# Patient Record
Sex: FEMALE | Race: WHITE | NOT HISPANIC OR LATINO | Employment: OTHER | URBAN - METROPOLITAN AREA
[De-identification: names, ages, dates, MRNs, and addresses within clinical notes are randomized per-mention and may not be internally consistent; named-entity substitution may affect disease eponyms.]

---

## 2021-12-30 PROBLEM — S32.9XXA: Status: ACTIVE | Noted: 2021-08-15

## 2022-01-05 ENCOUNTER — OFFICE VISIT (OUTPATIENT)
Dept: CARDIOLOGY | Facility: CLINIC | Age: 67
End: 2022-01-05

## 2022-01-05 VITALS
RESPIRATION RATE: 18 BRPM | WEIGHT: 126.4 LBS | SYSTOLIC BLOOD PRESSURE: 142 MMHG | DIASTOLIC BLOOD PRESSURE: 80 MMHG | HEART RATE: 88 BPM | HEIGHT: 66 IN | BODY MASS INDEX: 20.31 KG/M2

## 2022-01-05 DIAGNOSIS — R07.89 CHEST PAIN, ATYPICAL: Primary | ICD-10-CM

## 2022-01-05 PROCEDURE — 99204 OFFICE O/P NEW MOD 45 MIN: CPT | Performed by: INTERNAL MEDICINE

## 2022-01-05 RX ORDER — ATENOLOL 50 MG/1
50 TABLET ORAL DAILY
Qty: 30 TABLET | Refills: 5 | Status: SHIPPED | OUTPATIENT
Start: 2022-01-05 | End: 2022-04-27 | Stop reason: SDUPTHER

## 2022-01-05 RX ORDER — ATENOLOL 25 MG/1
TABLET ORAL DAILY
COMMUNITY
Start: 2021-12-13 | End: 2022-01-05 | Stop reason: DRUGHIGH

## 2022-01-05 RX ORDER — NITROGLYCERIN 0.4 MG/1
TABLET SUBLINGUAL AS NEEDED
COMMUNITY
Start: 2021-12-13

## 2022-01-21 NOTE — PROGRESS NOTES
"Cardiology Clinic Note  Redd Bashir MD, PhD    Subjective:     Encounter Date:01/05/2022      Patient ID: Juliann Turner is a 67 y.o. female.    Chief Complaint:  Chief Complaint   Patient presents with   • Chest Pain       HPI:    At the pleasure of seeing this very pleasant 66-year-old female is a new patient for chest pain.  Blood pressure 142/80 heart rate in the eighties.  She has had hypertension, she complains of substernal chest pain nonradiating sporadic, sometimes worse with exertion but also comes on at rest, she describes a 6-7 out of 10, aching moderate nonradiating, no palpitations or unexplained syncope, these things last seconds to minutes and then resolve spontaneously usually stress-induced.  No diaphoresis, no heart failure signs or symptoms.  She has not had 2D echo or ischemic evaluation in the past.  No family history of early CAD, mother with pacemaker, she is a smoker for many years at a pack per day, she is not diabetic    Historical data copied forward from previous encounters in EMR including the history, exam, and assessment/plan has been reviewed and is unchanged unless noted otherwise.    Cardiac medicines reviewed with risk, benefits, and necessity of each discussed.    Risk and benefit of cardiac testing reviewed including death heart attack stroke pain bleeding infection need for vascular /cardiovascular surgery were discussed and the patient     Objective:         /80 (BP Location: Left arm, Patient Position: Sitting)   Pulse 88   Resp 18   Ht 167.6 cm (66\")   Wt 57.3 kg (126 lb 6.4 oz)   BMI 20.40 kg/m²     Physical Exam  Regular rate and rhythm eighties no rubs gallops heaves lymph  No rubs gallops heaves left  No clubbing cyanosis or edema  Radial pulses 2+ bilaterally  Normal cap refill  Clear to auscultation    Assessment:         Diagnoses and all orders for this visit:    1. Chest pain, atypical (Primary)  -     Adult Transthoracic Echo Complete W/ Cont if " Necessary Per Protocol; Future  -     Stress Test With Myocardial Perfusion One Day; Future    Other orders  -     atenolol (TENORMIN) 50 MG tablet; Take 1 tablet by mouth Daily.  Dispense: 30 tablet; Refill: 5           Plan:          Chest pain concerning for angina, stress exacerbates, smoker is a risk factor along with hypertension and age  Stress test with nuclear imaging for restratification  Abnormal EKG, dyspnea exertion and chest pain, 2D echo for structural and functional evaluation along with shortness of breath, evaluate diastolic dysfunction valvular heart function    EKG is reviewed and interpreted by me recently obtained from outside clinic demonstrates sinus rhythm with normal ST-T wave findings, possibly incomplete right bundle branch block  And fasting lipid studies ultimately as outpatient    Uncontrolled blood pressure  Increase atenolol to 50 daily      Return clinic 30 days for follow-up and further recommendations    The pleasure to be involved in this patient's cardiovascular care.  Please call with any questions or concerns  Redd Bashir MD, PhD    Most recent EKG as reviewed and interpreted by me:  Procedures     Most recent echo as reviewed and interpreted by me:      Most recent stress test as reviewed and interpreted by me:      Most recent cardiac catheterization as reviewed interpreted by me:  No results found for this or any previous visit.    The following portions of the patient's history were reviewed and updated as appropriate: allergies, current medications, past family history, past medical history, past social history, past surgical history and problem list.      ROS:  14 point review of systems negative except as mentioned above    Current Outpatient Medications:   •  nitroglycerin (NITROSTAT) 0.4 MG SL tablet, As Needed., Disp: , Rfl:   •  atenolol (TENORMIN) 50 MG tablet, Take 1 tablet by mouth Daily., Disp: 30 tablet, Rfl: 5    Problem List:  Patient Active Problem List    Diagnosis   • Pelvis fracture, right, closed, initial encounter ([facility])   • Hypertension     Past Medical History:  Past Medical History:   Diagnosis Date   • Hypertension      Past Surgical History:  Past Surgical History:   Procedure Laterality Date   • APPENDECTOMY     • TONSILLECTOMY       Social History:  Social History     Socioeconomic History   • Marital status: Unknown   Tobacco Use   • Smoking status: Current Every Day Smoker     Packs/day: 1.00     Types: Cigarettes   Substance and Sexual Activity   • Alcohol use: Yes     Alcohol/week: 7.0 standard drinks     Types: 7 Glasses of wine per week     Allergies:  No Known Allergies  Immunizations:    There is no immunization history on file for this patient.         In-Office Procedure(s):  No orders to display        ASCVD RIsk Score::  The ASCVD Risk score (Christel VIDHYA Jr., et al., 2013) failed to calculate for the following reasons:    Cannot find a previous HDL lab    Cannot find a previous total cholesterol lab    Imaging:                 Lab Review:   No visits with results within 6 Month(s) from this visit.   Latest known visit with results is:   No results found for any previous visit.     Recent labs reviewed and interpreted for clinical significance and application            Level of Care:           Redd Bashir MD  01/20/22  .

## 2022-01-25 ENCOUNTER — PATIENT ROUNDING (BHMG ONLY) (OUTPATIENT)
Dept: CARDIOLOGY | Facility: CLINIC | Age: 67
End: 2022-01-25

## 2022-01-25 NOTE — PROGRESS NOTES
January 25, 2022    Hello, may I speak with Juliann Turner?    My name is Honey Turcios    I am  with [facility]  [address] [place] [address].    Before we get started may I verify your date of birth? 1955    I am calling to officially welcome you to our practice and ask about your recent visit. Is this a good time to talk? Yes    Tell me about your visit with us. What things went well?  Went real good was very happy with everything       We're always looking for ways to make our patients' experiences even better. Do you have recommendations on ways we may improve?  no    Overall were you satisfied with your first visit to our practice? Yes       I appreciate you taking the time to speak with me today. Is there anything else I can do for you? No      Thank you, and have a great day.

## 2022-02-01 ENCOUNTER — APPOINTMENT (OUTPATIENT)
Dept: NUCLEAR MEDICINE | Facility: HOSPITAL | Age: 67
End: 2022-02-01

## 2022-02-01 ENCOUNTER — APPOINTMENT (OUTPATIENT)
Dept: CARDIOLOGY | Facility: HOSPITAL | Age: 67
End: 2022-02-01

## 2022-02-16 ENCOUNTER — APPOINTMENT (OUTPATIENT)
Dept: NUCLEAR MEDICINE | Facility: HOSPITAL | Age: 67
End: 2022-02-16

## 2022-02-16 ENCOUNTER — APPOINTMENT (OUTPATIENT)
Dept: CARDIOLOGY | Facility: HOSPITAL | Age: 67
End: 2022-02-16

## 2022-03-23 ENCOUNTER — OFFICE VISIT (OUTPATIENT)
Dept: CARDIOLOGY | Facility: CLINIC | Age: 67
End: 2022-03-23

## 2022-03-23 VITALS
HEART RATE: 86 BPM | BODY MASS INDEX: 19.93 KG/M2 | RESPIRATION RATE: 18 BRPM | WEIGHT: 124 LBS | HEIGHT: 66 IN | DIASTOLIC BLOOD PRESSURE: 100 MMHG | SYSTOLIC BLOOD PRESSURE: 170 MMHG

## 2022-03-23 DIAGNOSIS — R07.89 CHEST PAIN, ATYPICAL: Primary | ICD-10-CM

## 2022-03-23 DIAGNOSIS — I10 PRIMARY HYPERTENSION: ICD-10-CM

## 2022-03-23 PROCEDURE — 99214 OFFICE O/P EST MOD 30 MIN: CPT | Performed by: INTERNAL MEDICINE

## 2022-03-23 RX ORDER — AMLODIPINE BESYLATE 5 MG/1
5 TABLET ORAL DAILY
Qty: 90 TABLET | Refills: 1 | Status: SHIPPED | OUTPATIENT
Start: 2022-03-23 | End: 2022-03-24 | Stop reason: SDUPTHER

## 2022-03-24 ENCOUNTER — TELEPHONE (OUTPATIENT)
Dept: CARDIOLOGY | Facility: CLINIC | Age: 67
End: 2022-03-24

## 2022-03-24 RX ORDER — AMLODIPINE BESYLATE 5 MG/1
5 TABLET ORAL DAILY
Qty: 90 TABLET | Refills: 1 | Status: SHIPPED | OUTPATIENT
Start: 2022-03-24 | End: 2022-04-27 | Stop reason: SDUPTHER

## 2022-03-24 NOTE — PROGRESS NOTES
Cardiology Clinic Note  Redd Bashir MD, PhD    Subjective:     Encounter Date:03/23/2022      Patient ID: Juliann Turner is a 67 y.o. female.    Chief Complaint:  Chief Complaint   Patient presents with   • Follow-up       HPI:    Previously I had the pleasure of seeing this very pleasant 66-year-old female is a new patient for chest pain.  Blood pressure 142/80 heart rate in the eighties.  She has had hypertension, she complains of substernal chest pain nonradiating sporadic, sometimes worse with exertion but also comes on at rest, she describes a 6-7 out of 10, aching moderate nonradiating, no palpitations or unexplained syncope, these things last seconds to minutes and then resolve spontaneously usually stress-induced.  No diaphoresis, no heart failure signs or symptoms.  She has not had 2D echo or ischemic evaluation in the past.  No family history of early CAD, mother with pacemaker, she is a smoker for many years at a pack per day, she is not diabetic.  She still has these similar complaints but did not get echo or stress since her prior encounter.  Her blood pressure still uncontrolled today at 170/100 with heart rates in the 80s.  She still a current smoker.  She has diffuse bruising on her extensor surfaces.  She can still complains of episodic chest discomfort described as heaviness with exertion.  Which is concerning for angina.  She is still a smoker and was counseled to quit extensively today       Historical data copied forward from previous encounters in EMR including the history, exam, and assessment/plan has been reviewed and is unchanged unless noted otherwise.    Cardiac medicines reviewed with risk, benefits, and necessity of each discussed.    Risk and benefit of cardiac testing reviewed including death heart attack stroke pain bleeding infection need for vascular /cardiovascular surgery were discussed and the patient     Objective:         /100 (BP Location: Left arm, Patient Position:  "Sitting)   Pulse 86   Resp 18   Ht 167.6 cm (66\")   Wt 56.2 kg (124 lb)   BMI 20.01 kg/m²     Physical Exam  Regular rate and rhythm no rubs or gallops  No heave no lift  No clubbing cyanosis or edema  Extensive bruising on extensor surfaces  Thin skin ultimately  Neurochecks consistent with likely COPD, lungs are clear no expiratory wheezes  Radial pulses 1+ bilaterally  Thin female  Walking with crutch at this time  Intact grossly  Assessment:         Chest pain  Cannot rule out angina  EKG was okay previously  Did not get ordered stress or echo  Needs to repeat these for risk assessment  Hypertension remains uncontrolled start amlodipine 5 daily in addition to her atenolol    See her back in 30 days for test results and discussion of further recommendations    Redd Bashir MD, PhD    Smoking cessation discussed  Primary prevention goals presently  Diet and exercise as allowed by functional status  Fall precautions with debility        The pleasure to be involved in this patient's cardiovascular care.  Please call with any questions or concerns  Redd Bashir MD, PhD    Most recent EKG as reviewed and interpreted by me:  Procedures     Most recent echo as reviewed and interpreted by me:      Most recent stress test as reviewed and interpreted by me:      Most recent cardiac catheterization as reviewed interpreted by me:  No results found for this or any previous visit.    The following portions of the patient's history were reviewed and updated as appropriate: allergies, current medications, past family history, past medical history, past social history, past surgical history and problem list.      ROS:  14 point review of systems negative except as mentioned above    Current Outpatient Medications:   •  atenolol (TENORMIN) 50 MG tablet, Take 1 tablet by mouth Daily., Disp: 30 tablet, Rfl: 5  •  nitroglycerin (NITROSTAT) 0.4 MG SL tablet, As Needed., Disp: , Rfl:   •  amLODIPine (NORVASC) 5 MG tablet, Take " 1 tablet by mouth Daily., Disp: 90 tablet, Rfl: 1    Problem List:  Patient Active Problem List   Diagnosis   • Pelvis fracture, right, closed, initial encounter ([facility])   • Hypertension     Past Medical History:  Past Medical History:   Diagnosis Date   • Hypertension      Past Surgical History:  Past Surgical History:   Procedure Laterality Date   • APPENDECTOMY     • TONSILLECTOMY       Social History:  Social History     Socioeconomic History   • Marital status: Unknown   Tobacco Use   • Smoking status: Current Every Day Smoker     Packs/day: 1.00     Types: Cigarettes   Substance and Sexual Activity   • Alcohol use: Yes     Alcohol/week: 7.0 standard drinks     Types: 7 Glasses of wine per week     Allergies:  No Known Allergies  Immunizations:    There is no immunization history on file for this patient.         In-Office Procedure(s):  No orders to display        ASCVD RIsk Score::  The ASCVD Risk score (Christel DC Jr., et al., 2013) failed to calculate for the following reasons:    Cannot find a previous HDL lab    Cannot find a previous total cholesterol lab    Imaging:                 Lab Review:   No visits with results within 6 Month(s) from this visit.   Latest known visit with results is:   No results found for any previous visit.     Recent labs reviewed and interpreted for clinical significance and application            Level of Care:           Redd Bashir MD  03/24/22  .

## 2022-03-24 NOTE — TELEPHONE ENCOUNTER
Patient does not currently have Rx coverage on her insurance. The amlodipine is too expensive at $120 a month. She says she is unable to take it. Wants to know if there is an alternative or if she should just skip it.

## 2022-04-12 ENCOUNTER — HOSPITAL ENCOUNTER (OUTPATIENT)
Dept: CARDIOLOGY | Facility: HOSPITAL | Age: 67
Discharge: HOME | End: 2022-04-12

## 2022-04-12 ENCOUNTER — HOSPITAL ENCOUNTER (OUTPATIENT)
Dept: NUCLEAR MEDICINE | Facility: HOSPITAL | Age: 67
Discharge: HOME | End: 2022-04-12

## 2022-04-12 DIAGNOSIS — R07.89 CHEST PAIN, ATYPICAL: ICD-10-CM

## 2022-04-12 LAB
BH CV ECHO MEAS - ACS: 1.88 CM
BH CV ECHO MEAS - AO MAX PG: 2.8 MMHG
BH CV ECHO MEAS - AO MEAN PG: 1.34 MMHG
BH CV ECHO MEAS - AO ROOT DIAM: 3.4 CM
BH CV ECHO MEAS - AO V2 MAX: 83.5 CM/SEC
BH CV ECHO MEAS - AO V2 VTI: 16.9 CM
BH CV ECHO MEAS - AVA(I,D): 4.2 CM2
BH CV ECHO MEAS - EDV(CUBED): 99.2 ML
BH CV ECHO MEAS - EF(MOD-SP4): 55 %
BH CV ECHO MEAS - ESV(CUBED): 43.5 ML
BH CV ECHO MEAS - FS: 24 %
BH CV ECHO MEAS - IVS/LVPW: 0.78 CM
BH CV ECHO MEAS - IVSD: 0.76 CM
BH CV ECHO MEAS - LA DIMENSION(2D): 3.2 CM
BH CV ECHO MEAS - LV MASS(C)D: 132.7 GRAMS
BH CV ECHO MEAS - LV MAX PG: 1.9 MMHG
BH CV ECHO MEAS - LV MEAN PG: 1.01 MMHG
BH CV ECHO MEAS - LV V1 MAX: 68.9 CM/SEC
BH CV ECHO MEAS - LV V1 VTI: 16 CM
BH CV ECHO MEAS - LVIDD: 4.6 CM
BH CV ECHO MEAS - LVIDS: 3.5 CM
BH CV ECHO MEAS - LVOT AREA: 4.5 CM2
BH CV ECHO MEAS - LVOT DIAM: 2.39 CM
BH CV ECHO MEAS - LVPWD: 0.98 CM
BH CV ECHO MEAS - MR MAX PG: 95.6 MMHG
BH CV ECHO MEAS - MR MAX VEL: 488.9 CM/SEC
BH CV ECHO MEAS - MV A MAX VEL: 82 CM/SEC
BH CV ECHO MEAS - MV DEC SLOPE: 179.6 CM/SEC2
BH CV ECHO MEAS - MV DEC TIME: 0.26 MSEC
BH CV ECHO MEAS - MV E MAX VEL: 45.9 CM/SEC
BH CV ECHO MEAS - MV E/A: 0.56
BH CV ECHO MEAS - MV MAX PG: 2.6 MMHG
BH CV ECHO MEAS - MV MEAN PG: 0.97 MMHG
BH CV ECHO MEAS - MV V2 VTI: 16.9 CM
BH CV ECHO MEAS - MVA(VTI): 4.2 CM2
BH CV ECHO MEAS - PA V2 MAX: 80.5 CM/SEC
BH CV ECHO MEAS - RV MAX PG: 1.64 MMHG
BH CV ECHO MEAS - RV V1 MAX: 64.1 CM/SEC
BH CV ECHO MEAS - RV V1 VTI: 14.9 CM
BH CV ECHO MEAS - RVDD: 2.8 CM
BH CV ECHO MEAS - RVOT DIAM: 2.11 CM
BH CV ECHO MEAS - SV(LVOT): 71.4 ML
BH CV ECHO MEAS - SV(RVOT): 52 ML
MAXIMAL PREDICTED HEART RATE: 155 BPM
STRESS TARGET HR: 132 BPM

## 2022-04-12 PROCEDURE — 93016 CV STRESS TEST SUPVJ ONLY: CPT | Performed by: INTERNAL MEDICINE

## 2022-04-12 PROCEDURE — 25010000002 REGADENOSON 0.4 MG/5ML SOLUTION: Performed by: INTERNAL MEDICINE

## 2022-04-12 PROCEDURE — A9502 TC99M TETROFOSMIN: HCPCS | Performed by: INTERNAL MEDICINE

## 2022-04-12 PROCEDURE — 93018 CV STRESS TEST I&R ONLY: CPT | Performed by: INTERNAL MEDICINE

## 2022-04-12 PROCEDURE — 93306 TTE W/DOPPLER COMPLETE: CPT | Performed by: INTERNAL MEDICINE

## 2022-04-12 PROCEDURE — 0 TECHNETIUM TETROFOSMIN KIT: Performed by: INTERNAL MEDICINE

## 2022-04-12 PROCEDURE — 93017 CV STRESS TEST TRACING ONLY: CPT

## 2022-04-12 PROCEDURE — 78452 HT MUSCLE IMAGE SPECT MULT: CPT

## 2022-04-12 PROCEDURE — 93306 TTE W/DOPPLER COMPLETE: CPT

## 2022-04-12 PROCEDURE — 78452 HT MUSCLE IMAGE SPECT MULT: CPT | Performed by: INTERNAL MEDICINE

## 2022-04-12 RX ADMIN — TETROFOSMIN 1 DOSE: 1.38 INJECTION, POWDER, LYOPHILIZED, FOR SOLUTION INTRAVENOUS at 12:20

## 2022-04-12 RX ADMIN — REGADENOSON 0.4 MG: 0.08 INJECTION, SOLUTION INTRAVENOUS at 12:20

## 2022-04-12 RX ADMIN — TETROFOSMIN 1 DOSE: 1.38 INJECTION, POWDER, LYOPHILIZED, FOR SOLUTION INTRAVENOUS at 10:51

## 2022-04-13 LAB
BH CV NUCLEAR PRIOR STUDY: 3
BH CV REST NUCLEAR ISOTOPE DOSE: 7.5 MCI
BH CV STRESS BP STAGE 1: NORMAL
BH CV STRESS BP STAGE 2: NORMAL
BH CV STRESS COMMENTS STAGE 1: NORMAL
BH CV STRESS COMMENTS STAGE 2: NORMAL
BH CV STRESS DOSE REGADENOSON STAGE 1: 0.4
BH CV STRESS DURATION MIN STAGE 1: 0
BH CV STRESS DURATION MIN STAGE 2: 4
BH CV STRESS DURATION SEC STAGE 1: 10
BH CV STRESS DURATION SEC STAGE 2: 0
BH CV STRESS HR STAGE 1: 122
BH CV STRESS HR STAGE 2: 99
BH CV STRESS NUCLEAR ISOTOPE DOSE: 21.7 MCI
BH CV STRESS PROTOCOL 1: NORMAL
BH CV STRESS RECOVERY BP: NORMAL MMHG
BH CV STRESS RECOVERY HR: 99 BPM
BH CV STRESS STAGE 1: 1
BH CV STRESS STAGE 2: 2
LV EF NUC BP: 63 %
MAXIMAL PREDICTED HEART RATE: 155 BPM
PERCENT MAX PREDICTED HR: 78.71 %
STRESS BASELINE BP: NORMAL MMHG
STRESS BASELINE HR: 99 BPM
STRESS PERCENT HR: 93 %
STRESS POST PEAK BP: NORMAL MMHG
STRESS POST PEAK HR: 122 BPM
STRESS TARGET HR: 132 BPM

## 2022-04-27 ENCOUNTER — OFFICE VISIT (OUTPATIENT)
Dept: CARDIOLOGY | Facility: CLINIC | Age: 67
End: 2022-04-27

## 2022-04-27 VITALS
HEIGHT: 66 IN | HEART RATE: 75 BPM | WEIGHT: 129.4 LBS | BODY MASS INDEX: 20.79 KG/M2 | RESPIRATION RATE: 18 BRPM | DIASTOLIC BLOOD PRESSURE: 76 MMHG | SYSTOLIC BLOOD PRESSURE: 138 MMHG

## 2022-04-27 DIAGNOSIS — I10 PRIMARY HYPERTENSION: ICD-10-CM

## 2022-04-27 DIAGNOSIS — R07.89 CHEST PAIN, ATYPICAL: Primary | ICD-10-CM

## 2022-04-27 PROCEDURE — 99214 OFFICE O/P EST MOD 30 MIN: CPT | Performed by: INTERNAL MEDICINE

## 2022-04-27 RX ORDER — ATORVASTATIN CALCIUM 10 MG/1
10 TABLET, FILM COATED ORAL DAILY
Qty: 30 TABLET | Refills: 5 | Status: SHIPPED | OUTPATIENT
Start: 2022-04-27

## 2022-04-27 RX ORDER — AMLODIPINE BESYLATE 5 MG/1
5 TABLET ORAL 2 TIMES DAILY
Qty: 180 TABLET | Refills: 1 | Status: SHIPPED | OUTPATIENT
Start: 2022-04-27

## 2022-04-27 RX ORDER — ATENOLOL 50 MG/1
50 TABLET ORAL 2 TIMES DAILY
Qty: 180 TABLET | Refills: 1 | Status: SHIPPED | OUTPATIENT
Start: 2022-04-27 | End: 2022-11-02

## 2022-04-27 NOTE — PROGRESS NOTES
Cardiology Clinic Note  Redd Bashir MD, PhD    Subjective:     Encounter Date:04/27/2022      Patient ID: Juliann Turner is a 67 y.o. female.    Chief Complaint:  Chief Complaint   Patient presents with   • Follow-up       HPI:      Previously I had the pleasure of seeing this very pleasant 66-year-old female is a new patient for chest pain.  Blood pressure 142/80 heart rate in the eighties.  She has had hypertension, she complains of substernal chest pain nonradiating sporadic, sometimes worse with exertion but also comes on at rest, she describes a 6-7 out of 10, aching moderate nonradiating, no palpitations or unexplained syncope, these things last seconds to minutes and then resolve spontaneously usually stress-induced.  No diaphoresis, no heart failure signs or symptoms.  She has not had 2D echo or ischemic evaluation in the past.  No family history of early CAD, mother with pacemaker, she is a smoker for many years at a pack per day, she is not diabetic.  She still has these similar complaints but did not get echo or stress since her prior encounter.  Her blood pressure still uncontrolled today at 170/100 with heart rates in the 80s.  She still a current smoker.  She has diffuse bruising on her extensor surfaces.  She can still complains of episodic chest discomfort described as heaviness with exertion.    She is still a smoker and was counseled to quit extensively previously and again today  She reports on re current visit today that her chest pain is much improved, she has poor exertional status otherwise walks with a crutch with history of pelvic fracture after fall, essential hypertension is controlled 120s 130s by her logs that she presents today.  Stress testing reveals fixed inferoseptal and inferior wall defect likely concerning for RCA occlusion, there is no reversibility identified in the anterior, septal, apical or lateral wall with complete viability.  Cued for not to have procedures and be  "treated with medicines at this point.  She is not on cholesterol medicine or aspirin.  We will start low-dose statin therapy today in addition to her beta-blocker and afterload reduction which she is amenable for.  She has chest pain she will call clinic which will prompt invasive ischemic evaluation to define her coronary anatomy, 2D echo revealed EF of 55-60 with no specific regional abnormality, no significant valvular abnormality, grade 1 diastolic dysfunction.    Secondary prevention goals for CAD given abnormal stress test, concern for inferoseptal infarct given fixed defect possibly with RCA that may be  which is unclear as there has been no invasive ischemic evaluation.  Her symptoms are overall better and she is trying to walk more with known PVD.  She is very frail we did advise smoking cessation, healthy diet activity etc.    Review of systems otherwise negative x14 point review of systems except as mentioned above              Historical data copied forward from previous encounters in EMR including the history, exam, and assessment/plan has been reviewed and is unchanged unless noted otherwise.    Cardiac medicines reviewed with risk, benefits, and necessity of each discussed.    Risk and benefit of cardiac testing reviewed including death heart attack stroke pain bleeding infection need for vascular /cardiovascular surgery were discussed and the patient     Objective:         /76 (BP Location: Left arm, Patient Position: Sitting)   Pulse 75   Resp 18   Ht 167.6 cm (66\")   Wt 58.7 kg (129 lb 6.4 oz)   BMI 20.89 kg/m²     Physical Exam  Regular rate and rhythm no rubs gallops heave or lift  1 of 6 stock ejection murmur lower sternal border  No carotid bruits or JVD  No clubbing cyanosis or edema  Bruises on extensor surfaces  Walks with a crutch  Tach grossly  Normocephalic atraumatic  Clear to auscultation  Assessment:       Abnormal stress  Essential hypertension  Risk factors for coronary " artery disease  Atypical chest pain    Abnormal stress  Fixed inferoseptal and inferior wall defect  No reversibility identified  Preserved EF 64% by gated imaging  Echo EF is 55 to 60%, grade 1 diastolic dysfunction, no significant valvular normality  Significant goals will be recommended with statin without aspirin  Continue afterload reduction with atorvastatin and amlodipine    See her back in 6 months    The pleasure to be involved in this patient's cardiovascular care.  Please call with any questions or concerns  Redd Bashir MD, PhD    Most recent EKG as reviewed and interpreted by me:  Procedures     Most recent echo as reviewed and interpreted by me:  Results for orders placed during the hospital encounter of 04/12/22    Adult Transthoracic Echo Complete W/ Cont if Necessary Per Protocol    Interpretation Summary  · Left ventricular ejection fraction appears to be 56 - 60%. Left ventricular systolic function is normal.  · Left ventricular diastolic function is consistent with (grade I) impaired relaxation.  · Estimated right ventricular systolic pressure from tricuspid regurgitation is normal (<35 mmHg).      Most recent stress test as reviewed and interpreted by me:  Results for orders placed during the hospital encounter of 04/12/22    Stress Test With Myocardial Perfusion One Day    Interpretation Summary  · Diaphragmatic attenuation and GI artifacts are present.  · Left ventricular ejection fraction is normal. (Calculated EF = 63%).  · Impressions are consistent with a low risk study.  · There is no prior study available for comparison.  · Findings consistent with an equivocal ECG stress test.    Intermediate risk study  There appears to be a fixed inferoseptal inferior defect of moderate size and moderate severity  Significant GI diaphragmatic attenuation seen with poor counts and visualization of the inferior and inferoseptal walls at rest and stress of both  Minimal reversibility seen between  stress and rest comparison  Polar maps with a summed difference score of only 2 with summed stress score of 11 and summed rest score of 9  Normal EF 63% by gated imaging  Stress ECG nonspecific abnormalities at baseline did not change at peak stress or recovery with pharmacologic study which is submaximal  No arrhythmias seen    Intermediate risk study, cannot rule out inferoseptal infarct versus diaphragmatic or GI attenuation with poor counts in the inferior and inferoseptal wall  Correlate with clinical risk factors accordingly would be recommended  Normal EF 63%      Most recent cardiac catheterization as reviewed interpreted by me:  No results found for this or any previous visit.    The following portions of the patient's history were reviewed and updated as appropriate: allergies, current medications, past family history, past medical history, past social history, past surgical history and problem list.      ROS:  14 point review of systems negative except as mentioned above    Current Outpatient Medications:   •  amLODIPine (NORVASC) 5 MG tablet, Take 1 tablet by mouth Daily. (Patient taking differently: Take 5 mg by mouth 2 (Two) Times a Day.), Disp: 90 tablet, Rfl: 1  •  atenolol (TENORMIN) 50 MG tablet, Take 1 tablet by mouth Daily. (Patient taking differently: Take 50 mg by mouth 2 (Two) Times a Day.), Disp: 30 tablet, Rfl: 5  •  nitroglycerin (NITROSTAT) 0.4 MG SL tablet, As Needed., Disp: , Rfl:     Problem List:  Patient Active Problem List   Diagnosis   • Pelvis fracture, right, closed, initial encounter ([facility])   • Hypertension     Past Medical History:  Past Medical History:   Diagnosis Date   • Hypertension      Past Surgical History:  Past Surgical History:   Procedure Laterality Date   • APPENDECTOMY     • TONSILLECTOMY       Social History:  Social History     Socioeconomic History   • Marital status: Single   Tobacco Use   • Smoking status: Current Every Day Smoker     Packs/day: 1.00     Types:  Cigarettes   Substance and Sexual Activity   • Alcohol use: Yes     Alcohol/week: 7.0 standard drinks     Types: 7 Glasses of wine per week     Allergies:  No Known Allergies  Immunizations:  Immunization History   Administered Date(s) Administered   • Influenza, Unspecified 11/01/2021            In-Office Procedure(s):  No orders to display        ASCVD RIsk Score::  The ASCVD Risk score (Christel KEE Jr., et al., 2013) failed to calculate for the following reasons:    Cannot find a previous HDL lab    Cannot find a previous total cholesterol lab    Imaging:                 Lab Review:   Hospital Outpatient Visit on 04/12/2022   Component Date Value   • Target HR (85%) 04/12/2022 132    • Max. Pred. HR (100%) 04/12/2022 155    • BH CV STRESS PROTOCOL 1 04/12/2022 Pharmacologic    • Stage 1 04/12/2022 1    • HR Stage 1 04/12/2022 122    • BP Stage 1 04/12/2022 151/83    • Duration Min Stage 1 04/12/2022 0    • Duration Sec Stage 1 04/12/2022 10    • Stress Dose Regadenoson * 04/12/2022 0.4    • Stress Comments Stage 1 04/12/2022 10 sec bolus injection    • Stage 2 04/12/2022 2    • HR Stage 2 04/12/2022 99    • BP Stage 2 04/12/2022 139/80    • Duration Min Stage 2 04/12/2022 4    • Duration Sec Stage 2 04/12/2022 0    • Stress Comments Stage 2 04/12/2022 recovery    • Baseline HR 04/12/2022 99    • Baseline BP 04/12/2022 161/86    • Peak HR 04/12/2022 122    • Percent Max Pred HR 04/12/2022 78.71    • Percent Target HR 04/12/2022 93    • Peak BP 04/12/2022 151/83    • Recovery HR 04/12/2022 99    • Recovery BP 04/12/2022 139/80    • Nuclear Prior Study 04/12/2022 3    • BH CV REST NUCLEAR ISOTO* 04/12/2022 7.5    • BH CV STRESS NUCLEAR ISO* 04/12/2022 21.7    • Nuc Stress EF 04/12/2022 63    Hospital Outpatient Visit on 04/12/2022   Component Date Value   • Target HR (85%) 04/12/2022 132    • Max. Pred. HR (100%) 04/12/2022 155    • ACS 04/12/2022 1.88    • Ao root diam 04/12/2022 3.4    • Ao pk ernesto 04/12/2022 83.5    •  Ao V2 VTI 04/12/2022 16.9    • MICHELL(I,D) 04/12/2022 4.2    • EDV(cubed) 04/12/2022 99.2    • EF(MOD-sp4) 04/12/2022 55.0    • ESV(cubed) 04/12/2022 43.5    • IVS/LVPW 04/12/2022 0.78    • LV mass(C)d 04/12/2022 132.7    • LV V1 max PG 04/12/2022 1.90    • LV V1 mean PG 04/12/2022 1.01    • LV V1 max 04/12/2022 68.9    • LVPWd 04/12/2022 0.98    • MR max PG 04/12/2022 95.6    • MV dec slope 04/12/2022 179.6    • MV dec time 04/12/2022 0.26    • MV V2 VTI 04/12/2022 16.9    • MVA(VTI) 04/12/2022 4.2    • PA V2 max 04/12/2022 80.5    • RV V1 max PG 04/12/2022 1.64    • RV V1 max 04/12/2022 64.1    • RV V1 VTI 04/12/2022 14.9    • RVIDd 04/12/2022 2.8    • SV(LVOT) 04/12/2022 71.4    • SV(RVOT) 04/12/2022 52.0    • Ao max PG 04/12/2022 2.8    • Ao mean PG 04/12/2022 1.34    • FS 04/12/2022 24.0    • IVSd 04/12/2022 0.76    • LA dimension(2D) 04/12/2022 3.2    • LV V1 VTI 04/12/2022 16.0    • LVIDd 04/12/2022 4.6    • LVIDs 04/12/2022 3.5    • LVOT area 04/12/2022 4.5    • LVOT diam 04/12/2022 2.39    • MV E/A 04/12/2022 0.56    • MV max PG 04/12/2022 2.6    • MV mean PG 04/12/2022 0.97    • RVOT diam 04/12/2022 2.11    • MR max ernesto 04/12/2022 488.9    • MV A max ernesto 04/12/2022 82.0    • MV E max ernesto 04/12/2022 45.9      Recent labs reviewed and interpreted for clinical significance and application            Level of Care:           Redd Bashir MD  04/27/22  .

## 2022-11-02 RX ORDER — ATENOLOL 50 MG/1
TABLET ORAL
Qty: 180 TABLET | Refills: 0 | Status: SHIPPED | OUTPATIENT
Start: 2022-11-02 | End: 2023-02-14

## 2023-02-14 RX ORDER — ATENOLOL 50 MG/1
TABLET ORAL
Qty: 180 TABLET | Refills: 0 | Status: SHIPPED | OUTPATIENT
Start: 2023-02-14

## 2023-05-19 RX ORDER — ATENOLOL 50 MG/1
TABLET ORAL
Qty: 180 TABLET | Refills: 0 | OUTPATIENT
Start: 2023-05-19